# Patient Record
(demographics unavailable — no encounter records)

---

## 2025-06-02 NOTE — HISTORY OF PRESENT ILLNESS
[FreeTextEntry1] : Referred by: Tracy Fishman NP   Chief Complaint: low back pain   History of Present Illness: MALORIE is a 73 year old male with a history of HTN, heart disease S/P 2 stents  who presents with low back pain. He notes tiredness in B/L legs with standing and walking for prolonged periods. 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 He notes that his pain is improved during the summer months. He is able to access a gym and pool through his apt. He has seen Dr. Arreguin in the past for epidural, ablation, and MILD.    Onset/Context of pain: Onset: 4 years; no inciting events Pain Location: low back Quality and timing of pain: pressure Radiation: no Pain Ratin/10 at its worst Aggravating Factors: standing, walking  Relieving Factors: sitting, laying down Numbness/Tingling: no   Red flag symptoms: Bowel and bladder: No loss of control Saddle anesthesia: Denies Weakness: Denies   Pertinent History: h/o Thrombocytopenia/bleeding tendency/platelet dysfunction: no h/o Liver disease/abnormal liver function: no h/o Chronic kidney disease (CKD)/abnormal kidney function: no Patient on dialysis: no h/o Diabetes: no Anticoagulation/Aspirin/Supplements: Plavix (Dr. Thais Cabrera, [facility])    Current Pain Medications: Gabapentin 100mg QHS - has not really needed Methocarbamol - has not really needed   Conservative Treatment: PT: completed PT in  without improvement   History of Relevant Surgeries: >30 years ago - L tibial fx and fixation w/bone graft   Injection History (date, procedure, %improvement): lumbar epidural - most effective, 2-3 months relief lumbar ablation - 2-3 months relief 23 - MILD L4-5 w/Dr. Arreguin   Social History: Alcohol: no Tobacco: no Recreational drug use: no Work: works with volleyball, softball   Chart review: Today I have reviewed available medical information in the patient's medical record, including relevant provider notes, laboratory work, and imaging.   Historical Review: I have reviewed the patient's past medical, surgical, social, and family history available in the EMR at this time along with supplemented information provided by the patient during the interview process today.   Medications and list of allergies: Medications and allergies were reviewed, reconciled and updated in the electronic medical record.

## 2025-06-02 NOTE — DATA REVIEWED
[MRI] : MRI [FreeTextEntry1] : MRI lumbar spine 5/28/25 ([facility]): FINDINGS: The conus terminates at L1.  Alignment of the lumbar spine is normal.  The bones are osteopenic with fatty marrow replacement. Multilevel disc desiccation is seen with prominent anterolateral marginal osteophytes. Modic type I endplate changes are seen at L2-L3 and L4-L5. Chronic Schmorl's node are seen along the opposing endplates of L1-L2 and L2-L3. No evidence of compression fracture.  Limited evaluation of the sacrum is normal. The visualized paravertebral soft tissues are unremarkable.  L1-L2: Diffuse 4 mm disc bulge. Mild bilateral facet hypertrophy. Mild canal stenosis. Mild bilateral subarticular recess narrowing. Moderate bilateral neuroforaminal narrowing. These findings are unchanged since the prior examination.  L2-L3: Diffuse 5 mm disc bulge. Mild bilateral facet hypertrophy. Moderate canal stenosis. Mild bilateral subarticular recess narrowing. Moderate bilateral neuroforaminal narrowing. These findings are not significantly changed since the prior examination.  L3-L4: Diffuse 5 mm disc bulge. Moderate bilateral facet hypertrophy. Mild left ligamentum flavum hypertrophy. Moderate canal stenosis. Moderate bilateral subarticular recess narrowing. Moderate-to-severe bilateral neuroforaminal narrowing. There is likely exiting bilateral L3 nerve roots impingement. These findings are stable since the prior examination.  L4-L5: Diffuse 4 mm disc bulge. Severe bilateral facet hypertrophy with small facet joint effusions. Moderate bilateral ligamentum flavum hypertrophy. Severe canal stenosis. Severe bilateral subarticular recess narrowing. Severe bilateral neuroforaminal narrowing. There is likely exiting bilateral L4 nerve roots impingement. These findings are not significantly changed since the prior examination.  L5-S1: Diffuse 2 mm disc bulge. Moderate bilateral facet hypertrophy. No canal stenosis. Mild bilateral subarticular recess narrowing. Mild bilateral neuroforaminal narrowing. These findings are unchanged since the prior examination.  IMPRESSION:  1. Osteopenia. 2. Multilevel moderate-to-severe degenerative disc and joint disease as described above with impingement of exiting bilateral L3 and L4 nerve roots.

## 2025-06-02 NOTE — DATA REVIEWED
[MRI] : MRI [FreeTextEntry1] : MRI lumbar spine 5/28/25 ([facility]): FINDINGS: The conus terminates at L1.  Alignment of the lumbar spine is normal.  The bones are osteopenic with fatty marrow replacement. Multilevel disc desiccation is seen with prominent anterolateral marginal osteophytes. Modic type I endplate changes are seen at L2-L3 and L4-L5. Chronic Schmorl's node are seen along the opposing endplates of L1-L2 and L2-L3. No evidence of compression fracture.  Limited evaluation of the sacrum is normal. The visualized paravertebral soft tissues are unremarkable.  L1-L2: Diffuse 4 mm disc bulge. Mild bilateral facet hypertrophy. Mild canal stenosis. Mild bilateral subarticular recess narrowing. Moderate bilateral neuroforaminal narrowing. These findings are unchanged since the prior examination.  L2-L3: Diffuse 5 mm disc bulge. Mild bilateral facet hypertrophy. Moderate canal stenosis. Mild bilateral subarticular recess narrowing. Moderate bilateral neuroforaminal narrowing. These findings are not significantly changed since the prior examination.  L3-L4: Diffuse 5 mm disc bulge. Moderate bilateral facet hypertrophy. Mild left ligamentum flavum hypertrophy. Moderate canal stenosis. Moderate bilateral subarticular recess narrowing. Moderate-to-severe bilateral neuroforaminal narrowing. There is likely exiting bilateral L3 nerve roots impingement. These findings are stable since the prior examination.  L4-L5: Diffuse 4 mm disc bulge. Severe bilateral facet hypertrophy with small facet joint effusions. Moderate bilateral ligamentum flavum hypertrophy. Severe canal stenosis. Severe bilateral subarticular recess narrowing. Severe bilateral neuroforaminal narrowing. There is likely exiting bilateral L4 nerve roots impingement. These findings are not significantly changed since the prior examination.  L5-S1: Diffuse 2 mm disc bulge. Moderate bilateral facet hypertrophy. No canal stenosis. Mild bilateral subarticular recess narrowing. Mild bilateral neuroforaminal narrowing. These findings are unchanged since the prior examination.  IMPRESSION:  1. Osteopenia. 2. Multilevel moderate-to-severe degenerative disc and joint disease as described above with impingement of exiting bilateral L3 and L4 nerve roots. Fall risk

## 2025-06-02 NOTE — PHYSICAL EXAM
[FreeTextEntry1] : Physical Exam: Constitutional: In NAD, calm and cooperative HEENT: NCAT, Anicteric sclera Cardio: Extremities appear pink and well perfused, no peripheral edema Respiratory: Normal respiratory effort on room air, no accessory muscle use Skin: no rashes seen on exposed skin, no visible abrasions Psych: Normal affect, intact judgment and insight Neuro: Awake, alert and oriented, see below for focused neurological exam  MSK (Back) Inspection: no gross swelling identified Palpation: no tenderness of the bilateral lumbar paraspinals ROM: full lumbar ROM Strength: 5/5 strength in bilateral lower extremities Reflexes: 2+ Patella reflex bilaterally, 1+ Achilles reflex bilaterally, negative clonus bilaterally Sensation: Intact to light touch in bilateral lower extremities   Special tests: Seated slump test: negative B/L LYLE: negative B/L FADIR: negative B/L Facet Loading: negative B/L

## 2025-06-02 NOTE — ASSESSMENT
[FreeTextEntry1] : MALORIE is a 73 year old male with a history of HTN, heart disease S/P 2 stents 2021 who presents with low back pain. MRI lumbar spine shows multilevel degenerative changes especially at L4-5. Pain is likely due to neurogenic claudication due to lumbar stenosis vs lumbar spondylosis.   Plan/Recommendations:   We reviewed etiology, predisposing factor(s), natural course, imaging results as well as treatment options including medications, physical therapy/exercise, therapeutic injections, and surgery. The risks, consequences, alternatives, and benefits of various treatment options were discussed with the patient in great detail. We have discussed and recommended the following:   - Medications: Continue Gabapentin and Methocarbamol as prescribed by Tracy Fishman NP.   - Imaging: MRI lumbar spine reviewed.   - Physical therapy/modalities/DME: Continue HEP.   - Interventional/Surgical procedures: May consider LESI 08/2025 (in preparation for new school year). Will try to obtain Dr. Arreguin's records. Patient has annual physical with PCP and cardiologist this month. He will speak to cardiologist regarding 7 day hold of Plavix and will have clearance faxed to me.    - Referrals: None indicated at this time.   - Activity: Continue activity as tolerated.  - Education: Cauda equina and associated symptoms, including motor weakness, bowel/bladder dysfunction, and perineal numbness were discussed. The patient was instructed to report to the Emergency department, if these symptoms occur.   - Follow-up: 2 months or sooner PRN.

## 2025-07-15 NOTE — HISTORY OF PRESENT ILLNESS
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 Mechanism of injury: NKI Have you been treated for this in the past?Y Have you had surgery for this in the past?Y- ORIF LEFT TIBIA W BONE GRAFT- 38 YRS AGO Physical Therapy/ HEP: None OTC Medicines: TYLENOL, ADVIL - PER CARDIO HAD TO STOP RX medicines: NONE Heat, Ice, Elevation: ICE AND HEAT CSI or Gel Injections:  None Other Previous Treatment: KNEE SLEEVE Prior Imaging/Studies:    Pain: At Rest:3 /10 With Activity:7 /10 Quality of symptoms: C/O LEFT KNEE LATERAL SIDED PAIN, MIN SWELLING, INSTABILTY, POPPING/GRINDING NOISE RIGHT KNEE -ANTERIOR KNEE PAIN, NO SWELLING 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    Affecting Sleep: NO Stiffness upon waking, lasting greater than 30mins: Yes Difficulty with stairs: Yes Difficulty getting in and out of car: Yes Sit to stand stiffness: Yes Affects walking short/long distances? Yes Home/Work/Recreation affected? Yes   Improves with: REST Worse with: STANDING/WALKIGN/EXERCISE   This is not a Work-Related Injury being treated under Worker's Compensation. This is not an athletic injury occurring associated with an interscholastic or organized sports team.

## 2025-07-15 NOTE — PHYSICAL EXAM
[Left] : left knee [Right] : right knee [AP] : anteroposterior [Lateral] : lateral [[place]] : skyline [AP Standing] : anteroposterior standing [] : no deformities of quad tendon [FreeTextEntry3] : LATERAL VERTICAL INCISION [FreeTextEntry9] : I obtained and independently interpreted todays x ray 07/15/2025  Medial joint space narrowing. Sclerosis of the medial knee. Varus deformity. Severe DJD medial compartment. Tricompartmental osteophyte formation. No fractures seen.      [TWNoteComboBox7] : flexion 105 degrees [de-identified] : extension 15 degrees

## 2025-07-15 NOTE — DISCUSSION/SUMMARY
[de-identified] : Diagnosis Knee Osteoarthritis   MALORIE PENNY 73 year  M was seen and evaluated in the office today. Following evaluation, and history of the patient's condition at length, the pathology was explained in full to the patient in layman's terms. Patient has Knee Osteoarthritis. Osteoarthritis is a degenerative joint disease where the cartilage in the knee gradually wears away, leading to pain, stiffness, and reduced mobility. Initially, symptoms may be mild, however this is a progressive condition, and the pain is expected to become more severe and persistent. Advanced stages of knee OA can result in significant disability, making daily activities challenging. I discussed with the patient that since this condition is expected to continue to worsen, they will eventually need a total knee replacement in their life time.   In the interim, discussed with patient several different treatment options regarding managing the gradual loss of function associated with knee OA, along with specific risks and benefits. Nonsurgical options including but not limited to Corticosteroid Injection, Visco Supplementation, Meloxicam 15mg, Medrol Dose Pack, along with activity modification such as non-impact exercise and organized physical therapy. The risk of morbidity associated with proposed treatments were discussed. -- RIGHT KNEE: At this time, indicated patient for Meloxicam 15mg due to pain and inflammation of the knee.  -Patient reports that they do not wish to take medication at this time AS THEY WERE ADVISED AGAINST FURTHER USE BY CARDIOLOGIST, patient refused prescription. -- At this time, patient is indicated for physical therapy due to their reduced ROM and weakness. Discussed with patient the benefits of physical therapy due to their current pain and limited function. -Following discussion of the options the plan at this time is for the patient to go forward with organized therapy. Patient was provided with a Rx for PT at this time. Patient expressed understanding the treatment plan and will begin PT as soon as they can. They will f/u in 6-8 weeks for further evaluation. -- Patient was advised to follow up in 2-3 months to further evaluate, or sooner if symptoms worsen. -- LEFT KNEE:   Assessment & Indication: The patient has progressively severe knee pain and disability secondary to degenerative joint disease (DJD) and has failed extensive non-surgical treatments, including activity modification, analgesic medications, and therapeutic exercise. Based on persistent symptoms and lack of response to non-surgical management, I have recommended a LEFT Total Knee Replacement (TKR).   Procedure Discussion: A detailed discussion was held regarding the nature of the total knee replacement procedure, including surgical steps, expected recovery timeline, and anticipated outcomes. A model of the implant to be used was utilized to demonstrate the various bearing surfaces and functionality.   Expected Recovery & Rehabilitation: The patient was informed of the expected time course for return of function and pain relief. We discussed the importance of compliance with postoperative instructions to facilitate optimal recovery and minimize complications. Emphasis was placed on active participation in rehabilitation and its impact on both short- and long-term outcomes. The discharge plan includes home care nursing and physical therapy, provided it is appropriate and covered by the patients insurance. The patient was encouraged to arrange for assistance at home following surgery.   Outcomes & Expectations: We reviewed the expected surgical outcomes, the likelihood of satisfaction after full recovery, and potential causes of dissatisfaction, including the possibility of recurrent pain, lack of improvement, or worsening pain.   Risks & Complications: The patient was informed in detail about the risks associated with total knee replacement, including but not limited to: Surgical risks: Infection, wound complications, bone fracture, tendon or ligament injury, nerve injury, vascular injury, hemorrhage, stiffness, instability, persistent pain, fixation failure, need for reoperation or manipulation under anesthesia. Perioperative medical risks: Deep vein thrombosis (DVT), pulmonary embolism, heart attack, stroke, cardiopulmonary complications, and other risks related to medical comorbidities, including elevated body mass index (BMI). Anesthesia risks: Potential complications related to anesthesia, including death, were also discussed. Though I defer to the anesthesia team to discuss complications of anesthesia procedures.   To mitigate these risks, we will use sterile technique, perioperative antibiotics, and DVT prophylaxis when appropriate. The patient will be monitored postoperatively in the office setting to track recovery progress.   Implant Selection & Durability: We discussed the durability of prosthetic knees and potential long-term concerns, including wear, osteolysis, and loosening. I plan to use a [employer] LEGION implant, which I feel most comfortable utilizing for primary TKR. If the patient prefers a different implant brand/type, they may request it, and I will consider the request or suggest an additional surgical opinion from a surgeon using that brand.   Preoperative Medical Clearance: The patient was advised of the need for a preoperative medical risk evaluation by their primary care physician (PCP). Additional subspecialty clearances, such as cardiac evaluation, may be required based on the PCPs assessment.   Informed Consent & Next Steps: The risks, benefits, and alternatives to surgery were discussed at length. The patient actively participated in the discussion, had their questions answered, and agreed to proceed with elective TKR. They were instructed to coordinate with my surgical team for scheduling, preoperative testing, and medical optimization.   Follow-up will be arranged for surgery, or sooner if needed. PATIENT DOES NOT CURRENTLY HAVE HIS MEDICAL HISTORY AVAILABLE, PATIENT WAS ADVISED TO ATTAIN AND PROVIDE.   Entered by Mike Marie acting as scribe. Dr. Goodrich Attestation The documentation recorded by the scribe, in my presence, accurately reflects the service I, Dr. Goodrich, personally performed, and the decisions made by me with my edits as appropriate.